# Patient Record
Sex: MALE | Race: BLACK OR AFRICAN AMERICAN | URBAN - METROPOLITAN AREA
[De-identification: names, ages, dates, MRNs, and addresses within clinical notes are randomized per-mention and may not be internally consistent; named-entity substitution may affect disease eponyms.]

---

## 2024-02-19 ENCOUNTER — OFFICE VISIT (OUTPATIENT)
Age: 34
End: 2024-02-19
Payer: COMMERCIAL

## 2024-02-19 VITALS
BODY MASS INDEX: 21.52 KG/M2 | TEMPERATURE: 98.8 F | RESPIRATION RATE: 18 BRPM | WEIGHT: 142 LBS | DIASTOLIC BLOOD PRESSURE: 72 MMHG | SYSTOLIC BLOOD PRESSURE: 113 MMHG | HEART RATE: 85 BPM | HEIGHT: 68 IN | OXYGEN SATURATION: 97 %

## 2024-02-19 DIAGNOSIS — Z11.59 ENCOUNTER FOR HCV SCREENING TEST FOR LOW RISK PATIENT: ICD-10-CM

## 2024-02-19 DIAGNOSIS — Z11.4 ENCOUNTER FOR SCREENING FOR HIV: ICD-10-CM

## 2024-02-19 DIAGNOSIS — Z01.89 ROUTINE LAB DRAW: ICD-10-CM

## 2024-02-19 DIAGNOSIS — Z83.3 FAMILY HISTORY OF DIABETES MELLITUS: ICD-10-CM

## 2024-02-19 DIAGNOSIS — Z76.89 ENCOUNTER TO ESTABLISH CARE: Primary | ICD-10-CM

## 2024-02-19 DIAGNOSIS — Z83.438 FAMILY HISTORY OF HYPERLIPIDEMIA: ICD-10-CM

## 2024-02-19 PROCEDURE — 99203 OFFICE O/P NEW LOW 30 MIN: CPT

## 2024-02-19 SDOH — ECONOMIC STABILITY: INCOME INSECURITY: HOW HARD IS IT FOR YOU TO PAY FOR THE VERY BASICS LIKE FOOD, HOUSING, MEDICAL CARE, AND HEATING?: HARD

## 2024-02-19 SDOH — ECONOMIC STABILITY: FOOD INSECURITY: WITHIN THE PAST 12 MONTHS, THE FOOD YOU BOUGHT JUST DIDN'T LAST AND YOU DIDN'T HAVE MONEY TO GET MORE.: NEVER TRUE

## 2024-02-19 SDOH — ECONOMIC STABILITY: FOOD INSECURITY: WITHIN THE PAST 12 MONTHS, YOU WORRIED THAT YOUR FOOD WOULD RUN OUT BEFORE YOU GOT MONEY TO BUY MORE.: SOMETIMES TRUE

## 2024-02-19 SDOH — ECONOMIC STABILITY: HOUSING INSECURITY
IN THE LAST 12 MONTHS, WAS THERE A TIME WHEN YOU DID NOT HAVE A STEADY PLACE TO SLEEP OR SLEPT IN A SHELTER (INCLUDING NOW)?: NO

## 2024-02-19 ASSESSMENT — PATIENT HEALTH QUESTIONNAIRE - PHQ9
SUM OF ALL RESPONSES TO PHQ QUESTIONS 1-9: 0
SUM OF ALL RESPONSES TO PHQ QUESTIONS 1-9: 0
2. FEELING DOWN, DEPRESSED OR HOPELESS: 0
SUM OF ALL RESPONSES TO PHQ QUESTIONS 1-9: 0
SUM OF ALL RESPONSES TO PHQ9 QUESTIONS 1 & 2: 0
1. LITTLE INTEREST OR PLEASURE IN DOING THINGS: 0
SUM OF ALL RESPONSES TO PHQ QUESTIONS 1-9: 0

## 2024-02-19 NOTE — PROGRESS NOTES
I reviewed with the resident the medical history and the resident's findings on the physical examination.  I discussed with the resident the patient's diagnosis and concur with the plan.  
Otilio Aguillon is a 33 y.o. male      Chief Complaint   Patient presents with    Establish Care     Patient is coming in to establish care. Patient would like a physical and blood work. No other concerns.        \"Have you been to the ER, urgent care clinic since your last visit?  Hospitalized since your last visit?\"    NO    “Have you seen or consulted any other health care providers outside of [facility] since your last visit?”    NO              Vitals:    02/19/24 1315   BP: 113/72   Site: Right Upper Arm   Position: Sitting   Pulse: 85   Resp: 18   Temp: 98.8 °F (37.1 °C)   TempSrc: Oral   SpO2: 97%   Weight: 64.4 kg (142 lb)   Height: 1.715 m (5' 7.52\")            Health Maintenance Due   Topic Date Due    Hepatitis B vaccine (1 of 3 - 3-dose series) Never done    COVID-19 Vaccine (1) Never done    Varicella vaccine (1 of 2 - 2-dose childhood series) Never done    Depression Screen  Never done    HIV screen  Never done    Hepatitis C screen  Never done    DTaP/Tdap/Td vaccine (1 - Tdap) Never done    Flu vaccine (1) Never done         Medication Reconciliation completed, changes noted.  Please  Update medication list.    
visit:    Encounter to establish care: No acute concerns. Here for blood work -- will see pt again for complete physical when he is able to return in 1-4 weeks. Routine labs as below.     Encounter for screening for HIV  -     HIV 1/2 Ag/Ab, 4TH Generation,W Rflx Confirm; Future    Encounter for HCV screening test for low risk patient  -     Hepatitis C Antibody; Future    Routine lab draw  -     Lipid Panel; Future  -     Comprehensive Metabolic Panel; Future  -     CBC with Auto Differential; Future  -     HIV 1/2 Ag/Ab, 4TH Generation,W Rflx Confirm; Future  -     Hepatitis B Surface Antigen; Future  -     Hepatitis B Surface Antibody; Future  -     Varicella Zoster Antibody, IgG; Future  -     Hepatitis C Antibody; Future    Family history of diabetes mellitus: Extended family, GM, will check fasted blood sugar.   -     CBC with Auto Differential; Future    Family history of hyperlipidemia: Questionable history for father -- will check lipid panel.   -     Lipid Panel; Future  -     Comprehensive Metabolic Panel; Future        Follow up:     Follow-up arranged for: Return for 1-4 weeks for physical with Dr. Bartlett.      Pt was discussed with Dr. Gilbert (attending physician).    I have reviewed pertinent labs results and other data. I have discussed the diagnosis with the patient and the intended plan as seen in the above orders. The patient has received an after-visit summary and questions were answered concerning future plans. I have discussed medication side effects and warnings with the patient as well.    Mohsin Ali, MD  Resident Formerly named [facility]

## 2024-02-20 LAB
ALBUMIN SERPL-MCNC: 4.6 G/DL (ref 3.5–5)
ALBUMIN/GLOB SERPL: 1.5 (ref 1.1–2.2)
ALP SERPL-CCNC: 61 U/L (ref 45–117)
ALT SERPL-CCNC: 23 U/L (ref 12–78)
ANION GAP SERPL CALC-SCNC: 4 MMOL/L (ref 5–15)
AST SERPL-CCNC: 14 U/L (ref 15–37)
BASOPHILS # BLD: 0 K/UL (ref 0–0.1)
BASOPHILS NFR BLD: 1 % (ref 0–1)
BILIRUB SERPL-MCNC: 1.2 MG/DL (ref 0.2–1)
BUN SERPL-MCNC: 14 MG/DL (ref 6–20)
BUN/CREAT SERPL: 16 (ref 12–20)
CALCIUM SERPL-MCNC: 9.6 MG/DL (ref 8.5–10.1)
CHLORIDE SERPL-SCNC: 103 MMOL/L (ref 97–108)
CHOLEST SERPL-MCNC: 150 MG/DL
CO2 SERPL-SCNC: 29 MMOL/L (ref 21–32)
CREAT SERPL-MCNC: 0.86 MG/DL (ref 0.7–1.3)
DIFFERENTIAL METHOD BLD: ABNORMAL
EOSINOPHIL # BLD: 0.1 K/UL (ref 0–0.4)
EOSINOPHIL NFR BLD: 1 % (ref 0–7)
ERYTHROCYTE [DISTWIDTH] IN BLOOD BY AUTOMATED COUNT: 12 % (ref 11.5–14.5)
GLOBULIN SER CALC-MCNC: 3 G/DL (ref 2–4)
GLUCOSE SERPL-MCNC: 77 MG/DL (ref 65–100)
HBV SURFACE AB SER QL: REACTIVE
HBV SURFACE AB SER-ACNC: 98.22 MIU/ML
HBV SURFACE AG SER QL: <0.1 INDEX
HBV SURFACE AG SER QL: NEGATIVE
HCT VFR BLD AUTO: 39.4 % (ref 36.6–50.3)
HCV AB SER IA-ACNC: 0.11 INDEX
HCV AB SERPL QL IA: NONREACTIVE
HDLC SERPL-MCNC: 64 MG/DL
HDLC SERPL: 2.3 (ref 0–5)
HGB BLD-MCNC: 12.7 G/DL (ref 12.1–17)
HIV 1+2 AB+HIV1 P24 AG SERPL QL IA: NONREACTIVE
HIV 1/2 RESULT COMMENT: NORMAL
IMM GRANULOCYTES # BLD AUTO: 0 K/UL (ref 0–0.04)
IMM GRANULOCYTES NFR BLD AUTO: 0 % (ref 0–0.5)
LDLC SERPL CALC-MCNC: 77.2 MG/DL (ref 0–100)
LYMPHOCYTES # BLD: 2 K/UL (ref 0.8–3.5)
LYMPHOCYTES NFR BLD: 33 % (ref 12–49)
MCH RBC QN AUTO: 32.1 PG (ref 26–34)
MCHC RBC AUTO-ENTMCNC: 32.2 G/DL (ref 30–36.5)
MCV RBC AUTO: 99.5 FL (ref 80–99)
MONOCYTES # BLD: 0.3 K/UL (ref 0–1)
MONOCYTES NFR BLD: 5 % (ref 5–13)
NEUTS SEG # BLD: 3.7 K/UL (ref 1.8–8)
NEUTS SEG NFR BLD: 60 % (ref 32–75)
NRBC # BLD: 0 K/UL (ref 0–0.01)
NRBC BLD-RTO: 0 PER 100 WBC
PLATELET # BLD AUTO: 203 K/UL (ref 150–400)
PMV BLD AUTO: 12.7 FL (ref 8.9–12.9)
POTASSIUM SERPL-SCNC: 3.5 MMOL/L (ref 3.5–5.1)
PROT SERPL-MCNC: 7.6 G/DL (ref 6.4–8.2)
RBC # BLD AUTO: 3.96 M/UL (ref 4.1–5.7)
SODIUM SERPL-SCNC: 136 MMOL/L (ref 136–145)
TRIGL SERPL-MCNC: 44 MG/DL
VLDLC SERPL CALC-MCNC: 8.8 MG/DL
WBC # BLD AUTO: 6.2 K/UL (ref 4.1–11.1)

## 2024-02-21 LAB — VZV IGG SER IA-ACNC: 3249 INDEX

## 2024-03-04 ENCOUNTER — TELEPHONE (OUTPATIENT)
Age: 34
End: 2024-03-04

## 2024-03-04 NOTE — TELEPHONE ENCOUNTER
Patient is requesting appt., is having difficulty focusing at work, he is very  traumatized by his work environment. His work responsibilities have changed, the demands are not reasonable for his personal situation. Something traumatic happened with a particular individual in the area they want to place him in.    You can email him.    Pls call him.

## 2024-03-08 ENCOUNTER — OFFICE VISIT (OUTPATIENT)
Age: 34
End: 2024-03-08

## 2024-03-08 VITALS
WEIGHT: 142 LBS | SYSTOLIC BLOOD PRESSURE: 115 MMHG | OXYGEN SATURATION: 98 % | HEIGHT: 68 IN | HEART RATE: 73 BPM | BODY MASS INDEX: 21.52 KG/M2 | DIASTOLIC BLOOD PRESSURE: 68 MMHG | RESPIRATION RATE: 17 BRPM | TEMPERATURE: 98 F

## 2024-03-08 DIAGNOSIS — Z00.00 ENCOUNTER FOR PHYSICAL EXAMINATION: Primary | ICD-10-CM

## 2024-03-08 DIAGNOSIS — H61.20 CERUMEN IN AUDITORY CANAL ON EXAMINATION: ICD-10-CM

## 2024-03-08 ASSESSMENT — PATIENT HEALTH QUESTIONNAIRE - PHQ9
SUM OF ALL RESPONSES TO PHQ QUESTIONS 1-9: 1
1. LITTLE INTEREST OR PLEASURE IN DOING THINGS: 0
2. FEELING DOWN, DEPRESSED OR HOPELESS: 1
SUM OF ALL RESPONSES TO PHQ9 QUESTIONS 1 & 2: 1
SUM OF ALL RESPONSES TO PHQ QUESTIONS 1-9: 1

## 2024-03-08 NOTE — PROGRESS NOTES
ROUTINE ANNUAL WELL MALE EXAM  Subjective   Otilio Aguillon is a 33 y.o. male who presents to clinic today for annual physical exam.      He would also like to address the following issues:  None    Chronic Medical Problems:  - None    Diet: chocolate bars occasionally, \"vegan chips\", drinks tea, energy drinks occasionally, chicken, does not eat pork or shrimp, salads  Exercise: no exercise    # FH:  - CAD/CVA: P-uncle passed of CVA in 40's  - HTN: Unsure  - HLD: suspects father may have HLD  - Diabetes mellitus: P-GM T2DM  - Malignancy: P-aunt with BC in 60's    Social History:  Tobacco use: Denies current of former tobacco use   Alcohol use: Rarely, wine occasionally   Drug use: Denies current drug use       Review of Systems:  Elements of ROS in HPI above    Preventative care:  Alcohol abuse screening (CAGE): N/A    Depression screenin   HIV: HIV neg  Tdap: Not sure, declined  COVID: None, declined  Influenza:  Declined    Diabetes screening:   No results found for: \"LABA1C\"    HLD screening:  Lab Results   Component Value Date    CHOL 150 2024    TRIG 44 2024    HDL 64 2024    LDLCALC 77.2 2024    CHOLHDLRATIO 2.3 2024       Hep C screening: NR (2024)    Past Medical History  History reviewed. No pertinent past medical history.    Allergies  No Known Allergies    Current Medications  No current outpatient medications on file prior to visit.     No current facility-administered medications on file prior to visit.       Surgical History  Past Surgical History:   Procedure Laterality Date    PATELLA FRACTURE SURGERY      WISDOM TOOTH EXTRACTION  2006       Family History  Family History   Problem Relation Age of Onset    Glaucoma Maternal Grandmother     Diabetes Paternal Grandmother      Objective   Vital Signs  /68   Pulse 73   Temp 98 °F (36.7 °C) (Temporal)   Resp 17   Ht 1.715 m (5' 7.5\")   Wt 64.4 kg (142 lb)   SpO2 98%   BMI 21.91 kg/m²     Body mass index is

## 2024-03-08 NOTE — PROGRESS NOTES
Pt roomed by first and last name and .    Chief Complaint   Patient presents with    Annual Exam        Vitals:    24 1445   BP: 115/68   Pulse: 73   Resp: 17   Temp: 98 °F (36.7 °C)   TempSrc: Temporal   SpO2: 98%   Weight: 64.4 kg (142 lb)   Height: 1.715 m (5' 7.5\")        1. Have you been to the ER, urgent care clinic since your last visit?  Hospitalized since your last visit?No    2. Have you seen or consulted any other health care providers outside of the [facility] since your last visit?  Include any pap smears or colon screening. No